# Patient Record
Sex: MALE | Race: WHITE | NOT HISPANIC OR LATINO | URBAN - METROPOLITAN AREA
[De-identification: names, ages, dates, MRNs, and addresses within clinical notes are randomized per-mention and may not be internally consistent; named-entity substitution may affect disease eponyms.]

---

## 2020-10-17 ENCOUNTER — EMERGENCY (EMERGENCY)
Facility: HOSPITAL | Age: 50
LOS: 0 days | Discharge: HOME | End: 2020-10-17
Attending: HOSPITALIST
Payer: COMMERCIAL

## 2020-10-17 VITALS
RESPIRATION RATE: 17 BRPM | HEART RATE: 66 BPM | TEMPERATURE: 98 F | SYSTOLIC BLOOD PRESSURE: 124 MMHG | OXYGEN SATURATION: 97 % | DIASTOLIC BLOOD PRESSURE: 62 MMHG

## 2020-10-17 VITALS
TEMPERATURE: 98 F | SYSTOLIC BLOOD PRESSURE: 133 MMHG | DIASTOLIC BLOOD PRESSURE: 77 MMHG | RESPIRATION RATE: 16 BRPM | OXYGEN SATURATION: 99 % | WEIGHT: 179.9 LBS | HEIGHT: 70 IN | HEART RATE: 71 BPM

## 2020-10-17 DIAGNOSIS — K21.9 GASTRO-ESOPHAGEAL REFLUX DISEASE WITHOUT ESOPHAGITIS: ICD-10-CM

## 2020-10-17 DIAGNOSIS — R07.89 OTHER CHEST PAIN: ICD-10-CM

## 2020-10-17 DIAGNOSIS — Z86.79 PERSONAL HISTORY OF OTHER DISEASES OF THE CIRCULATORY SYSTEM: ICD-10-CM

## 2020-10-17 DIAGNOSIS — K44.9 DIAPHRAGMATIC HERNIA WITHOUT OBSTRUCTION OR GANGRENE: ICD-10-CM

## 2020-10-17 LAB
ADD ON TEST-SPECIMEN IN LAB: SIGNIFICANT CHANGE UP
ALBUMIN SERPL ELPH-MCNC: 4.1 G/DL — SIGNIFICANT CHANGE UP (ref 3.3–5)
ALP SERPL-CCNC: 59 U/L — SIGNIFICANT CHANGE UP (ref 40–120)
ALT FLD-CCNC: 18 U/L — SIGNIFICANT CHANGE UP (ref 12–78)
ANION GAP SERPL CALC-SCNC: 5 MMOL/L — SIGNIFICANT CHANGE UP (ref 5–17)
AST SERPL-CCNC: 13 U/L — LOW (ref 15–37)
BASOPHILS # BLD AUTO: 0.05 K/UL — SIGNIFICANT CHANGE UP (ref 0–0.2)
BASOPHILS NFR BLD AUTO: 0.5 % — SIGNIFICANT CHANGE UP (ref 0–2)
BILIRUB SERPL-MCNC: 1.1 MG/DL — SIGNIFICANT CHANGE UP (ref 0.2–1.2)
BUN SERPL-MCNC: 18 MG/DL — SIGNIFICANT CHANGE UP (ref 7–23)
CALCIUM SERPL-MCNC: 9.3 MG/DL — SIGNIFICANT CHANGE UP (ref 8.5–10.1)
CHLORIDE SERPL-SCNC: 104 MMOL/L — SIGNIFICANT CHANGE UP (ref 96–108)
CO2 SERPL-SCNC: 29 MMOL/L — SIGNIFICANT CHANGE UP (ref 22–31)
CREAT SERPL-MCNC: 1.12 MG/DL — SIGNIFICANT CHANGE UP (ref 0.5–1.3)
EOSINOPHIL # BLD AUTO: 0.02 K/UL — SIGNIFICANT CHANGE UP (ref 0–0.5)
EOSINOPHIL NFR BLD AUTO: 0.2 % — SIGNIFICANT CHANGE UP (ref 0–6)
GLUCOSE SERPL-MCNC: 114 MG/DL — HIGH (ref 70–99)
HCT VFR BLD CALC: 43.4 % — SIGNIFICANT CHANGE UP (ref 39–50)
HGB BLD-MCNC: 14 G/DL — SIGNIFICANT CHANGE UP (ref 13–17)
IMM GRANULOCYTES NFR BLD AUTO: 0.3 % — SIGNIFICANT CHANGE UP (ref 0–1.5)
LYMPHOCYTES # BLD AUTO: 0.98 K/UL — LOW (ref 1–3.3)
LYMPHOCYTES # BLD AUTO: 9 % — LOW (ref 13–44)
MCHC RBC-ENTMCNC: 30 PG — SIGNIFICANT CHANGE UP (ref 27–34)
MCHC RBC-ENTMCNC: 32.3 GM/DL — SIGNIFICANT CHANGE UP (ref 32–36)
MCV RBC AUTO: 93.1 FL — SIGNIFICANT CHANGE UP (ref 80–100)
MONOCYTES # BLD AUTO: 0.72 K/UL — SIGNIFICANT CHANGE UP (ref 0–0.9)
MONOCYTES NFR BLD AUTO: 6.6 % — SIGNIFICANT CHANGE UP (ref 2–14)
NEUTROPHILS # BLD AUTO: 9.05 K/UL — HIGH (ref 1.8–7.4)
NEUTROPHILS NFR BLD AUTO: 83.4 % — HIGH (ref 43–77)
PLATELET # BLD AUTO: 202 K/UL — SIGNIFICANT CHANGE UP (ref 150–400)
POTASSIUM SERPL-MCNC: 4.2 MMOL/L — SIGNIFICANT CHANGE UP (ref 3.5–5.3)
POTASSIUM SERPL-SCNC: 4.2 MMOL/L — SIGNIFICANT CHANGE UP (ref 3.5–5.3)
PROT SERPL-MCNC: 8 GM/DL — SIGNIFICANT CHANGE UP (ref 6–8.3)
RBC # BLD: 4.66 M/UL — SIGNIFICANT CHANGE UP (ref 4.2–5.8)
RBC # FLD: 12.6 % — SIGNIFICANT CHANGE UP (ref 10.3–14.5)
SODIUM SERPL-SCNC: 138 MMOL/L — SIGNIFICANT CHANGE UP (ref 135–145)
TROPONIN I SERPL-MCNC: <0.015 NG/ML — SIGNIFICANT CHANGE UP (ref 0.01–0.04)
TROPONIN I SERPL-MCNC: <0.015 NG/ML — SIGNIFICANT CHANGE UP (ref 0.01–0.04)
WBC # BLD: 10.85 K/UL — HIGH (ref 3.8–10.5)
WBC # FLD AUTO: 10.85 K/UL — HIGH (ref 3.8–10.5)

## 2020-10-17 PROCEDURE — 99284 EMERGENCY DEPT VISIT MOD MDM: CPT | Mod: 25

## 2020-10-17 PROCEDURE — 71046 X-RAY EXAM CHEST 2 VIEWS: CPT | Mod: 26

## 2020-10-17 PROCEDURE — 93010 ELECTROCARDIOGRAM REPORT: CPT

## 2020-10-17 PROCEDURE — 93005 ELECTROCARDIOGRAM TRACING: CPT

## 2020-10-17 PROCEDURE — 85379 FIBRIN DEGRADATION QUANT: CPT

## 2020-10-17 PROCEDURE — 99285 EMERGENCY DEPT VISIT HI MDM: CPT

## 2020-10-17 PROCEDURE — 36415 COLL VENOUS BLD VENIPUNCTURE: CPT

## 2020-10-17 PROCEDURE — 85025 COMPLETE CBC W/AUTO DIFF WBC: CPT

## 2020-10-17 PROCEDURE — 84484 ASSAY OF TROPONIN QUANT: CPT

## 2020-10-17 PROCEDURE — 80053 COMPREHEN METABOLIC PANEL: CPT

## 2020-10-17 PROCEDURE — 71046 X-RAY EXAM CHEST 2 VIEWS: CPT

## 2020-10-17 NOTE — ED ADULT NURSE NOTE - CHPI ED NUR SYMPTOMS NEG
no shortness of breath/no chills/no vomiting/no nausea/no back pain/no dizziness/no fever/no diaphoresis

## 2020-10-17 NOTE — ED PROVIDER NOTE - PATIENT PORTAL LINK FT
You can access the FollowMyHealth Patient Portal offered by [facility] by registering at the following website: [email or web address]. By joining [employer]’s FollowMyHealth portal, you will also be able to view your health information using other applications (apps) compatible with our system.

## 2020-10-17 NOTE — ED PROVIDER NOTE - CARE PROVIDER_API CALL
Miguel Sandoval (DO)  Cardiology  [address] [place] [address]  Phone: [phone]  Fax: [phone]  Follow Up Time:

## 2020-10-17 NOTE — ED ADULT NURSE NOTE - CHPI ED NUR SYMPTOMS POS
Pharmacy paged regarding patient's right arm IV site. Amiodarone running at 0.5mg 30ml/HR infiltrated from 22g IV. Site is red, swollen, and tender to touch. Recommendation from pharmacy to apply warm compress and elevate extremity.Site Marked. IV discontinued. Warm compress applied. No other orders. Will continue to monitor.    CHEST DISCOMFORT

## 2020-10-17 NOTE — ED PROVIDER NOTE - NS_ ATTENDINGSCRIBEDETAILS _ED_A_ED_FT
Julieta Coffman MD: The history, relevant review of systems, past medical and surgical history, medical decision making, and physical examination was documented by the scribe in my presence and I attest to the accuracy of the documentation.

## 2020-10-17 NOTE — ED PROVIDER NOTE - PROGRESS NOTE DETAILS
Allison De Santiago for attending Dr. Garcia: Spoke to Dr. Madison Mcneill, stated that there is some suspected ST segment elevation in 2, 3 AVF. Pt has low pre test probability. Sent picture of ekg to Dr. Madison Mcneill for evaluation. Will speak to cardio for eval of ekg. Allison De Santiago for attending Dr. Garcia: Spoke to Dr. Madison Lafleur, stated that there is some suspected ST segment elevation in 2, 3 AVF. Pt has low pre test probability. Sent picture of ekg to Dr. Madison Mcneill for evaluation. Will speak to cardio for eval of ekg. Received call back from Dr. Lafleur, evaluated ekg, not concerned with slight abnormality in 2, 3 AVF. Considered normal ekg. Cardio consult of ekg appreciated. Allison DAVISON for ED attending, Dr. Garcia: Spoke with pt and updated him on results of labs and imaging. Sign out from Dr. Julieta Coffman was to wait for 2 sets of troponin and have pt reevaluated. I Susana Penny attest that this documentation has been prepared under the direction and in the presence of Doctor Garcia. Allison DAVISON for ED attending, Dr. Garcia: Provided pt with hard copies of labs and imaging results including negative d-dimer and troponin. Asked pt if he wishes me to talk to his family about results, pt  states his wife is outside in the waiting room and he will tell her himself. Offered pt admission and come in for cardiac evaluation. Pt wishes to go home and I will call cardiologist Dr. Sandoval to try and arrange appt for pt to see Dr. Sandoval on Monday 10/19.

## 2020-10-17 NOTE — ED PROVIDER NOTE - OTHER FINDINGS
Evaluated ekg 14:30, considering abnormal readings in leads 2, 3, avf. Suspected ST elevations, contacted Dr. Lafleur, who stated after seeing ekg that he is not concerned and considered this a normal variant ekg.

## 2020-10-17 NOTE — ED PROVIDER NOTE - OBJECTIVE STATEMENT
51 y/o male with PMHx of pericarditis, hiatal hernia, and GERD presents to ED BIBA from urgent care c/o CP. Pt woke up with CP and tightness more so than baseline. Took Gasx and Nexium with no relief. Denies cough, recent travel, SOB. Pt feels better with rest, pain is worse with deep breathing. 4 ASA administered at urgent care. Non smoker, no past stress test.

## 2020-10-17 NOTE — ED PROVIDER NOTE - CLINICAL SUMMARY MEDICAL DECISION MAKING FREE TEXT BOX
51 y/o male with midsternal CP worse with deep breaths. Hx of pericarditis. Will workup for recurrence and ACS. ASA given in urgent care.

## 2020-10-17 NOTE — ED PROVIDER NOTE - NSFOLLOWUPINSTRUCTIONS_ED_ALL_ED_FT
Please return to us immediately if your have any worsening of symptoms. Please note that your EKG was evaluated by our cardiologist and did not show any abnormal findings, that you had two sets of blood work in the ER for your heart both of which came negative, and you had blood work done that makes blood clots less likely as the cause of your chest pain.    You were offered admission but you stated you wish to go home, I provided you with hard copies of your labs and imaging. You will return to us immediately if you have any worsening of the symptoms. Please also return to us if you wish to get further evaluation, or if you have any palpitation, shortness of breath, worsening pain, or weakness or lightheadedness. For all other health concerns follow up with your primary care physician and your cardiologist. I have provided you with the number for a cardiologist Dr. Sandoval. Please contact him and try to follow up with him no later than Monday morning. For all other health concerns or if you have any other health issues please return to us immediately.    As we discussed, two sets of heart enzymes do not rule out the possibility of a heart attack however it makes it less likely so please if you have any symptoms or worsening then return to us immediately. If you can not see your cardiologist then return to us immediately.      _________________      Chest Pain    WHAT YOU NEED TO KNOW:    Chest pain can be caused by a range of conditions, from not serious to life-threatening. Chest pain can be a symptom of a digestive problem, such as acid reflux or a stomach ulcer. An anxiety attack or a strong emotion, such as anger, can also cause chest pain. Infection, inflammation, or a fracture in the bones or cartilage in your chest can cause pain or discomfort. Sometimes chest pain or pressure is caused by poor blood flow to your heart (angina). Chest pain may also be caused by life-threatening conditions such as a heart attack or blood clot in your lungs.     DISCHARGE INSTRUCTIONS:    Call 911 if:   •You have any of the following signs of a heart attack: ?Squeezing, pressure, or pain in your chest      ?You may also have any of the following: ?Discomfort or pain in your back, neck, jaw, stomach, or arm      ?Shortness of breath      ?Nausea or vomiting      ?Lightheadedness or a sudden cold sweat            Return to the emergency department if:   •You have chest discomfort that gets worse, even with medicine.      •You cough or vomit blood.       •Your bowel movements are black or bloody.       •You cannot stop vomiting, or it hurts to swallow.       Contact your healthcare provider if:   •You have questions or concerns about your condition or care.          Medicines:   •Medicines may be given to treat the cause of your chest pain. Examples include pain medicine, anxiety medicine, or medicines to increase blood flow to your heart.       •Do not take certain medicines without asking your healthcare provider first. These include NSAIDs, herbal or vitamin supplements, or hormones (estrogen or progestin).       •Take your medicine as directed. Contact your healthcare provider if you think your medicine is not helping or if you have side effects. Tell him or her if you are allergic to any medicine. Keep a list of the medicines, vitamins, and herbs you take. Include the amounts, and when and why you take them. Bring the list or the pill bottles to follow-up visits. Carry your medicine list with you in case of an emergency.      Follow up with your healthcare provider within 72 hours, or as directed: You may need to return for more tests to find the cause of your chest pain. You may be referred to a specialist, such as a cardiologist or gastroenterologist. Write down your questions so you remember to ask them during your visits.     Healthy living tips: The following are general healthy guidelines. If your chest pain is caused by a heart problem, your healthcare provider will give you specific guidelines to follow.  •Do not smoke. Nicotine and other chemicals in cigarettes and cigars can cause lung and heart damage. Ask your healthcare provider for information if you currently smoke and need help to quit. E-cigarettes or smokeless tobacco still contain nicotine. Talk to your healthcare provider before you use these products.       •Eat a variety of healthy, low-fat, low-salt foods. Healthy foods include fruits, vegetables, whole-grain breads, low-fat dairy products, beans, lean meats, and fish. Ask for more information about a heart healthy diet.      •Drink plenty of water every day. Your body is made of mostly water. Water helps your body to control your temperature and blood pressure. Ask your healthcare provider how much water you should drink every day.      •Ask about activity. Your healthcare provider will tell you which activities to limit or avoid. Ask when you can drive, return to work, and have sex. Ask about the best exercise plan for you.      •Maintain a healthy weight. Ask your healthcare provider how much you should weigh. Ask him or her to help you create a weight loss plan if you are overweight.       •Get the flu and pneumonia vaccines. All adults should get the influenza (flu) vaccine. Get it every year as soon as it becomes available. The pneumococcal vaccine is given to adults aged 65 years or older. The vaccine is given every 5 years to prevent pneumococcal disease, such as pneumonia.      If you have a stent:   •Carry your stent card with you at all times.       •Let all healthcare providers know that you have a stent.     _______________      Gastroesophageal Reflux Disease, Adult    Gastroesophageal reflux (ALAN) happens when acid from the stomach flows up into the tube that connects the mouth and the stomach (esophagus). Normally, food travels down the esophagus and stays in the stomach to be digested. However, when a person has ALAN, food and stomach acid sometimes move back up into the esophagus. If this becomes a more serious problem, the person may be diagnosed with a disease called gastroesophageal reflux disease (GERD). GERD occurs when the reflux:  •Happens often.       •Causes frequent or severe symptoms.       •Causes problems such as damage to the esophagus.      When stomach acid comes in contact with the esophagus, the acid may cause soreness (inflammation) in the esophagus. Over time, GERD may create small holes (ulcers) in the lining of the esophagus.      What are the causes?    This condition is caused by a problem with the muscle between the esophagus and the stomach (lower esophageal sphincter, or LES). Normally, the LES muscle closes after food passes through the esophagus to the stomach. When the LES is weakened or abnormal, it does not close properly, and that allows food and stomach acid to go back up into the esophagus.  The LES can be weakened by certain dietary substances, medicines, and medical conditions, including:  •Tobacco use.      •Pregnancy.      •Having a hiatal hernia.      •Alcohol use.      •Certain foods and beverages, such as coffee, chocolate, onions, and peppermint.        What increases the risk?  You are more likely to develop this condition if you:  •Have an increased body weight.      •Have a connective tissue disorder.      •Use NSAID medicines.        What are the signs or symptoms?  Symptoms of this condition include:  •Heartburn.      •Difficult or painful swallowing.      •The feeling of having a lump in the throat.      •A bitter taste in the mouth.      •Bad breath.      •Having a large amount of saliva.      •Having an upset or bloated stomach.      •Belching.      •Chest pain. Different conditions can cause chest pain. Make sure you see your health care provider if you experience chest pain.      •Shortness of breath or wheezing.      •Ongoing (chronic) cough or a night-time cough.      •Wearing away of tooth enamel.      •Weight loss.        How is this diagnosed?  Your health care provider will take a medical history and perform a physical exam. To determine if you have mild or severe GERD, your health care provider may also monitor how you respond to treatment. You may also have tests, including:  •A test to examine your stomach and esophagus with a small camera (endoscopy).      •A test that measures the acidity level in your esophagus.      •A test that measures how much pressure is on your esophagus.      •A barium swallow or modified barium swallow test to show the shape, size, and functioning of your esophagus.        How is this treated?  The goal of treatment is to help relieve your symptoms and to prevent complications. Treatment for this condition may vary depending on how severe your symptoms are. Your health care provider may recommend:  •Changes to your diet.      •Medicine.      •Surgery.        Follow these instructions at home:      Eating and drinking    •Follow a diet as recommended by your health care provider. This may involve avoiding foods and drinks such as:  •Coffee and tea (with or without caffeine).      •Drinks that contain alcohol.      •Energy drinks and sports drinks.      •Carbonated drinks or sodas.      •Chocolate and cocoa.      •Peppermint and mint flavorings.      •Garlic and onions.      •Horseradish.      •Spicy and acidic foods, including peppers, chili powder, sharma powder, vinegar, hot sauces, and barbecue sauce.      •Citrus fruit juices and citrus fruits, such as oranges, ritu, and limes.      •Tomato-based foods, such as red sauce, chili, salsa, and pizza with red sauce.      •Fried and fatty foods, such as donuts, french fries, potato chips, and high-fat dressings.      •High-fat meats, such as hot dogs and fatty cuts of red and white meats, such as rib eye steak, sausage, ham, and enciso.      •High-fat dairy items, such as whole milk, butter, and cream cheese.        •Eat small, frequent meals instead of large meals.      •Avoid drinking large amounts of liquid with your meals.      •Avoid eating meals during the 2–3 hours before bedtime.      •Avoid lying down right after you eat.      • Do not exercise right after you eat.        Lifestyle      • Do not use any products that contain nicotine or tobacco, such as cigarettes, e-cigarettes, and chewing tobacco. If you need help quitting, ask your health care provider.      •Try to reduce your stress by using methods such as yoga or meditation. If you need help reducing stress, ask your health care provider.      •If you are overweight, reduce your weight to an amount that is healthy for you. Ask your health care provider for guidance about a safe weight loss goal.      General instructions     •Pay attention to any changes in your symptoms.      •Take over-the-counter and prescription medicines only as told by your health care provider. Do not take aspirin, ibuprofen, or other NSAIDs unless your health care provider told you to do so.      •Wear loose-fitting clothing. Do not wear anything tight around your waist that causes pressure on your abdomen.      •Raise (elevate) the head of your bed about 6 inches (15 cm).      •Avoid bending over if this makes your symptoms worse.      •Keep all follow-up visits as told by your health care provider. This is important.        Contact a health care provider if:  •You have:  •New symptoms.      •Unexplained weight loss.      •Difficulty swallowing or it hurts to swallow.      •Wheezing or a persistent cough.      •A hoarse voice.        •Your symptoms do not improve with treatment.        Get help right away if you:    •Have pain in your arms, neck, jaw, teeth, or back.      •Feel sweaty, dizzy, or light-headed.      •Have chest pain or shortness of breath.      •Vomit and your vomit looks like blood or coffee grounds.      •Faint.      •Have stool that is bloody or black.      •Cannot swallow, drink, or eat.        Summary    •Gastroesophageal reflux happens when acid from the stomach flows up into the esophagus. GERD is a disease in which the reflux happens often, causes frequent or severe symptoms, or causes problems such as damage to the esophagus.      •Treatment for this condition may vary depending on how severe your symptoms are. Your health care provider may recommend diet and lifestyle changes, medicine, or surgery.      •Contact a health care provider if you have new or worsening symptoms.      •Take over-the-counter and prescription medicines only as told by your health care provider. Do not take aspirin, ibuprofen, or other NSAIDs unless your health care provider told you to do so.      •Keep all follow-up visits as told by your health care provider. This is important.      This information is not intended to replace advice given to you by your health care provider. Make sure you discuss any questions you have with your health care provider.

## 2020-11-27 ENCOUNTER — OUTPATIENT (OUTPATIENT)
Dept: OUTPATIENT SERVICES | Facility: HOSPITAL | Age: 50
LOS: 1 days | End: 2020-11-27
Payer: OTHER GOVERNMENT

## 2020-11-27 DIAGNOSIS — Z11.59 ENCOUNTER FOR SCREENING FOR OTHER VIRAL DISEASES: ICD-10-CM

## 2020-11-27 PROBLEM — K44.9 DIAPHRAGMATIC HERNIA WITHOUT OBSTRUCTION OR GANGRENE: Chronic | Status: ACTIVE | Noted: 2020-10-18

## 2020-11-27 PROBLEM — K21.9 GASTRO-ESOPHAGEAL REFLUX DISEASE WITHOUT ESOPHAGITIS: Chronic | Status: ACTIVE | Noted: 2020-10-18

## 2020-11-27 PROBLEM — I31.9 DISEASE OF PERICARDIUM, UNSPECIFIED: Chronic | Status: ACTIVE | Noted: 2020-10-18

## 2020-11-27 LAB — SARS-COV-2 RNA SPEC QL NAA+PROBE: SIGNIFICANT CHANGE UP

## 2020-11-27 PROCEDURE — U0003: CPT

## 2020-11-28 DIAGNOSIS — Z11.59 ENCOUNTER FOR SCREENING FOR OTHER VIRAL DISEASES: ICD-10-CM

## 2021-08-16 NOTE — ED ADULT NURSE NOTE - CAS EDP DISCH TYPE
Chief Complaints and History of Present Illnesses   Patient presents with     Glaucoma       Chief Complaint(s) and History of Present Illness(es)     Glaucoma     Laterality: both eyes              Comments     Patient here for yearly exam, last dilated exam 9/2020 with Dr. James, glaucoma eval done with Dr. Lara 11/2020, no follow up visits after. Pt notes change in distance vision over the last year, last glasses update was 2 years ago. Uses Systane drops about twice a day, finds this helps his eyes not feel so strained. Does have some burning of the eyes in the morning, right worse than left. Will use warm wash cloth on his eyes in the morning for about a minute.    Previously seen at [facility] for macular degeneration (per Dr. James note, last visit was 2019-pt does not remember this).  2002-retinal repair of the right eye  Cataract surgery done by Dr. Lara in 2016                Nadira Ceron, COA     Home

## 2024-11-08 NOTE — ED ADULT NURSE NOTE - OBJECTIVE STATEMENT
There were no immediate complications during the procedure. Patient sent in by urgent care to ED complaining of CP and tightness since 6:30 am. Patient states " I took GasX and nexium with no relief. Patient has a hx of pericarditis, gerd and hiatal hernia. Denies any radiation to neck, jaw or arm. Denies any SOB. Denies any n/v. Given 4 aspirin by EMS.